# Patient Record
Sex: MALE | Race: WHITE | URBAN - METROPOLITAN AREA
[De-identification: names, ages, dates, MRNs, and addresses within clinical notes are randomized per-mention and may not be internally consistent; named-entity substitution may affect disease eponyms.]

---

## 2018-02-27 ENCOUNTER — OFFICE VISIT (OUTPATIENT)
Dept: DERMATOLOGY | Facility: CLINIC | Age: 63
End: 2018-02-27
Payer: COMMERCIAL

## 2018-02-27 VITALS
HEIGHT: 73 IN | SYSTOLIC BLOOD PRESSURE: 127 MMHG | BODY MASS INDEX: 24.98 KG/M2 | DIASTOLIC BLOOD PRESSURE: 71 MMHG | WEIGHT: 188.5 LBS | HEART RATE: 80 BPM

## 2018-02-27 DIAGNOSIS — D22.9 NEVUS: ICD-10-CM

## 2018-02-27 DIAGNOSIS — D48.5 NEOPLASM OF UNCERTAIN BEHAVIOR OF SKIN: ICD-10-CM

## 2018-02-27 DIAGNOSIS — Z85.828 HISTORY OF BASAL CELL CANCER: ICD-10-CM

## 2018-02-27 DIAGNOSIS — Z85.820 HISTORY OF MELANOMA: Primary | ICD-10-CM

## 2018-02-27 DIAGNOSIS — L82.1 SK (SEBORRHEIC KERATOSIS): ICD-10-CM

## 2018-02-27 DIAGNOSIS — L30.9 CHRONIC DERMATITIS OF HANDS: ICD-10-CM

## 2018-02-27 DIAGNOSIS — L81.4 LENTIGO: ICD-10-CM

## 2018-02-27 PROCEDURE — 88305 TISSUE EXAM BY PATHOLOGIST: CPT | Performed by: DERMATOLOGY

## 2018-02-27 PROCEDURE — 99203 OFFICE O/P NEW LOW 30 MIN: CPT | Mod: 25 | Performed by: DERMATOLOGY

## 2018-02-27 PROCEDURE — 11100 HC BIOPSY SKIN/SUBQ/MUC MEM, SINGLE LESION: CPT | Performed by: DERMATOLOGY

## 2018-02-27 RX ORDER — OXYBUTYNIN CHLORIDE 10 MG/1
10 TABLET, EXTENDED RELEASE ORAL DAILY
COMMUNITY

## 2018-02-27 RX ORDER — BETAMETHASONE DIPROPIONATE 0.5 MG/G
CREAM TOPICAL
Qty: 100 G | Refills: 3 | Status: SHIPPED | OUTPATIENT
Start: 2018-02-27

## 2018-02-27 RX ORDER — LATANOPROST 50 UG/ML
1 SOLUTION/ DROPS OPHTHALMIC AT BEDTIME
COMMUNITY

## 2018-02-27 RX ORDER — TIMOLOL MALEATE 2.5 MG/ML
1 SOLUTION OPHTHALMIC EVERY MORNING
COMMUNITY

## 2018-02-27 NOTE — NURSING NOTE
"Chief Complaint   Patient presents with     Derm Problem       Initial /71  Pulse 80  Ht 1.854 m (6' 1\")  Wt 85.5 kg (188 lb 8 oz)  BMI 24.87 kg/m2 Estimated body mass index is 24.87 kg/(m^2) as calculated from the following:    Height as of this encounter: 1.854 m (6' 1\").    Weight as of this encounter: 85.5 kg (188 lb 8 oz).  Medication Reconciliation: complete     "

## 2018-02-27 NOTE — MR AVS SNAPSHOT
After Visit Summary   2/27/2018    Jl Choi    MRN: [id number]           Patient Information     Date Of Birth          1955        Visit Information        Provider Department      2/27/2018 2:15 PM Christ Yang MD [facility]        Today's Diagnoses     History of melanoma    -  1    Lentigo        History of basal cell cancer        SK (seborrheic keratosis)        Nevus        Neoplasm of uncertain behavior of skin        Chronic dermatitis of hands          Care Instructions    Return to clinic in 4 weeks to recheck your hands and arms.  Cream RX at pharmacy in [place].   WET DRESSINGS for your arms and wrist.     WHEN TO USE:      Wet dressings play an important role in the treatment of eczema and dermatitis    Wet dressings should be used when you are is hot and itchy and if you wake at night due to the itch     Wet dressings may also be used if there is blood on the sheets or if the rash is still present despite treatment with cortisone ointments, moisturisers and bath oils    Early use of wet dressings will reduce the amount of cortisone creams needed to control the eczema    HOW THEY HELP:      Wet dressings help to reduce itch by cooling the skin. The itch is worse when the skin is hot and inflamed    Wet dressings help with the treatment of infection, as they help to clean the skin s surface    Applying moisturiser under the wet dressings helps to rehydrate the skin    Wet dressings protect the skin from fingernails and scratching, and help the skin to heal    HOW TO APPLY:    Getting Started You will need:    Bowl    Tepid water    Cortisone or anti-inflammatory cream (if prescribed)    Moisturizer     Disposable towels    Crepe bandages  Setting up:    Wash your hands    Fold disposable towels in half    Fill bowl with tepid water    Add disposable towels to bowl    Spoon creams out onto a dry towel    Applying creams:    Apply cortisone or  anti- inflammatory creams, as prescribed, to all areas affected with eczema     Apply moisturiser over the cortisone ointments and to the whole of the body and face    Apply wet towels:     Wrap the wet towels around the areas of eczema, using a few layers    Apply Crepe bandages: (or you may use Gauze or Kerlix)     Wrap crepe bandages around the wet towels, firmly but not tightly    Avoid direct contact of the bandage with the skin    Applying the wet T-shirt and bandana     Wet cool compresses can be applied to the neck as a scarf (only knot once), and a wet bandana can be applied to the head    The scarf and bandana should be applied only under supervision and not at bedtime    For the trunk, apply a wet T-shirt or singlet. This can be repeated as often as needed and a dry T-shirt can be applied over the top    Important information:     Wet dressings are best applied at night, however they can be used during the day if the eczema is severe    Wet dressings will dry after a few hours. Do not leave the dressings on dry (unless asleep) as dry dressings can irritate the skin by causing it to become hot, dry and itchy     Crepe bandages used for wet dressings may be washed in the washing machine. Do not wash or reuse the disposable towels     Wound Care Instructions     FOR SUPERFICIAL WOUNDS ON THE    L Shin        AFTER 24 HOURS YOU SHOULD REMOVE THE BANDAGE AND BEGIN DAILY DRESSING CHANGES AS FOLLOWS:     1) Remove Dressing.     2) Clean and dry the area with tap water using a Q-tip or sterile gauze pad.     3) Apply Aquafor, Vaseline, Polysporin ointment or Bacitracin ointment over entire wound.  Do NOT use Neosporin ointment.     4) Cover the wound with a band-aid, or a sterile non-stick gauze pad and micropore paper tape      REPEAT THESE INSTRUCTIONS AT LEAST ONCE A DAY UNTIL THE WOUND HAS COMPLETELY HEALED.    It is an old wives tale that a wound heals better when it is exposed to air and allowed to dry out.  The wound will heal faster with a better cosmetic result if it is kept moist with ointment and covered with a bandage.  Do not let the wound dry out.      IMPORTANT INFORMATION ON REVERSE SIDE.      Supplies Needed:      *Cotton tipped applicators (Q-tips)    *Polysporin Ointment or Bacitracin Ointment (NOT NEOSPORIN)    *Band-aids or non-stick gauze pads and micropore paper tape.              PATIENT INFORMATION    During the healing process you will notice a number of changes. All wounds develop a small halo of redness surrounding the wound.  This means healing is occurring. Severe itching with extensive redness usually indicates sensitivity to the ointment or bandage tape used to dress the wound.  You should call our office if this develops.      Swelling  and/or discoloration around your surgical site is common, particularly when performed around the eye.    All wounds normally drain.  The larger the wound the more drainage there will be.  After 7-10 days, you will notice the wound beginning to shrink and new skin will begin to grow.  The wound is healed when you can see skin has formed over the entire area.  A healed wound has a healthy, shiny look to the surface and is red to dark pink in color to normalize.  Wounds may take approximately 4-6 weeks to heal.  Larger wounds may take 6-8 weeks.  After the wound is healed you may discontinue dressing changes.    You may experience a sensation of tightness as your wound heals. This is normal and will gradually subside.    Your healed wound may be sensitive to temperature changes. This sensitivity improves with time, but if you re having a lot of discomfort, try to avoid temperature extremes.    Patients frequently experience itching after their wound appears to have healed because of the continue healing under the skin.  Plain Vaseline will help relieve the itching.                    Follow-ups after your visit        Who to contact     If you have questions or  "need follow up information about today's clinic visit or your schedule please contact [facility] directly at [phone].  Normal or non-critical lab and imaging results will be communicated to you by [employer], letter or phone within 4 business days after the clinic has received the results. If you do not hear from us within 7 days, please contact the clinic through [employer] or phone. If you have a critical or abnormal lab result, we will notify you by phone as soon as possible.  Submit refill requests through [employer] or call your pharmacy and they will forward the refill request to us. Please allow 3 business days for your refill to be completed.          Additional Information About Your Visit        [employer] Information     [employer] lets you send messages to your doctor, view your test results, renew your prescriptions, schedule appointments and more. To sign up, go to [email or web address] . Click on \"Log in\" on the left side of the screen, which will take you to the Welcome page. Then click on \"Sign up Now\" on the right side of the page.     You will be asked to enter the access code listed below, as well as some personal information. Please follow the directions to create your username and password.     Your access code is: [email or web address]  Expires: 2018  2:44 PM     Your access code will  in 90 days. If you need help or a new code, please call your [facility] clinic or [phone].        Care EveryWhere ID     This is your Care EveryWhere ID. This could be used by other organizations to access your [facility] medical records  [id number]        Your Vitals Were     Pulse Height BMI (Body Mass Index)             80 1.854 m (6' 1\") 24.87 kg/m2          Blood Pressure from Last 3 Encounters:   18 127/71    Weight from Last 3 Encounters:   18 85.5 kg (188 lb 8 oz)              We Performed the Following     BIOPSY SKIN/SUBQ/MUC MEM, SINGLE LESION     Surgical pathology exam        "   Today's Medication Changes          These changes are accurate as of 2/27/18  2:50 PM.  If you have any questions, ask your nurse or doctor.               Start taking these medicines.        Dose/Directions    augmented betamethasone dipropionate 0.05 % cream   Commonly known as:  DIPROLENE-AF   Used for:  History of melanoma, Lentigo, History of basal cell cancer, SK (seborrheic keratosis), Nevus, Neoplasm of uncertain behavior of skin, Chronic dermatitis of hands   Started by:  Christ Yang MD        Apply sparingly to affected area twice daily as needed.  Do not apply to face.   Quantity:  100 g   Refills:  3            Where to get your medicines      These medications were sent to [facility] [address] [place] [address] [address]   [address] [facility] [address]     Phone:  [phone]     augmented betamethasone dipropionate 0.05 % cream                Primary Care Provider Fax #    Physician No Ref-Primary [phone]       No address on file        Equal Access to Services     CADEN STEPHENSON AH: Hadii aad ku hadasho Soomaali, waaxda luqadaha, qaybta kaalmada adeegyada, tasha rios . So [facility] [phone].    ATENCIÓN: Si blaze bello, tiene a cazares disposición servicios gratuitos de asistencia lingüística. Llame al [phone].    We comply with applicable federal civil rights laws and [place] laws. We do not discriminate on the basis of race, color, national origin, age, disability, sex, sexual orientation, or gender identity.            Thank you!     Thank you for choosing [facility]  for your care. Our goal is always to provide you with excellent care. Hearing back from our patients is one way we can continue to improve our services. Please take a few minutes to complete the written survey that you may receive in the mail after your visit with us. Thank you!             Your Updated Medication List - Protect others around you:  Learn how to safely use, store and throw away your medicines at www.disposemymeds.org.          This list is accurate as of 2/27/18  2:50 PM.  Always use your most recent med list.                   Brand Name Dispense Instructions for use Diagnosis    augmented betamethasone dipropionate 0.05 % cream    DIPROLENE-AF    100 g    Apply sparingly to affected area twice daily as needed.  Do not apply to face.    History of melanoma, Lentigo, History of basal cell cancer, SK (seborrheic keratosis), Nevus, Neoplasm of uncertain behavior of skin, Chronic dermatitis of hands       latanoprost 0.005 % ophthalmic solution    XALATAN     1 drop At Bedtime    History of melanoma, Lentigo, History of basal cell cancer, SK (seborrheic keratosis), Nevus, Neoplasm of uncertain behavior of skin, Chronic dermatitis of hands       oxybutynin 10 MG 24 hr tablet    DITROPAN-XL     Take 10 mg by mouth daily    History of melanoma, Lentigo, History of basal cell cancer, SK (seborrheic keratosis), Nevus, Neoplasm of uncertain behavior of skin, Chronic dermatitis of hands       timolol 0.25 % ophthalmic gel-form    TIMOPTIC-XE     1 drop every morning    History of melanoma, Lentigo, History of basal cell cancer, SK (seborrheic keratosis), Nevus, Neoplasm of uncertain behavior of skin, Chronic dermatitis of hands

## 2018-02-27 NOTE — LETTER
2/27/2018         RE: Jl Choi  [address] [facility] [address]        Dear Colleague,    Thank you for referring your patient, Jl Choi, to the [facility]. Please see a copy of my visit note below.    Jl Choi is a 62 year old year old male patient here today for hx of melanoma no nodes check was on back years ago, hx of basal cell carcinoma.  He ntoes rash on hands.   .  Patient states this has been present for a while.  Patient reports the following symptoms:  fissuring.  Patient reports the following previous treatments none.  Patient reports the following modifying factors none.  Associated symptoms: none.  Patient has no other skin complaints today.  Remainder of the HPI, Meds, PMH, Allergies, FH, and SH was reviewed in chart.      Past Medical History:   Diagnosis Date     Basal cell carcinoma      Malignant melanoma (H)     Back       History reviewed. No pertinent surgical history.     History reviewed. No pertinent family history.    Social History     Social History     Marital status: Single     Spouse name: N/A     Number of children: N/A     Years of education: N/A     Occupational History     Not on file.     Social History Main Topics     Smoking status: Former Smoker     Smokeless tobacco: Former User     Alcohol use Not on file     Drug use: Not on file     Sexual activity: Not on file     Other Topics Concern     Not on file     Social History Narrative     No narrative on file       Outpatient Encounter Prescriptions as of 2/27/2018   Medication Sig Dispense Refill     augmented betamethasone dipropionate (DIPROLENE-AF) 0.05 % cream Apply sparingly to affected area twice daily as needed.  Do not apply to face. 100 g 3     oxybutynin (DITROPAN-XL) 10 MG 24 hr tablet Take 10 mg by mouth daily       latanoprost (XALATAN) 0.005 % ophthalmic solution 1 drop At Bedtime       timolol (TIMOPTIC-XE) 0.25 % ophthalmic gel-form 1 drop every morning       No  "facility-administered encounter medications on file as of 2/27/2018.              Review Of Systems  Skin: As above  Eyes: negative  Ears/Nose/Throat: negative  Respiratory: No shortness of breath, dyspnea on exertion, cough, or hemoptysis  Cardiovascular: negative  Gastrointestinal: negative  Genitourinary: negative  Musculoskeletal: negative  Neurologic: negative  Psychiatric: negative  Hematologic/Lymphatic/Immunologic: negative  Endocrine: negative      O:   NAD, WDWN, Alert & Oriented, Mood & Affect wnl, Vitals stable   Here today alone   /71  Pulse 80  Ht 1.854 m (6' 1\")  Wt 85.5 kg (188 lb 8 oz)  BMI 24.87 kg/m2   General appearance normal   Vitals stable   Alert, oriented and in no acute distress      Following lymph nodes palpated: Occipital, Cervical, Supraclavicular , axilla no lad   slk    pigmented maculeson left back and trunk with regular borders and pigment netwokrs   Fissured red plaques on hands    L shin 2cm red scaly papule         The remainder of the full exam was unremarkable; the following areas were examined:  conjunctiva/lids, oral mucosa, neck, peripheral vascular system, abdomen, lymph nodes, digits/nails, eccrine and apocrine glands, scalp/hair, face, neck, chest, abdomen, buttocks, back, RUE, LUE, RLE, LLE       Eyes: Conjunctivae/lids:Normal     ENT: Lips, buccal mucosa, tongue: normal    MSK:Normal    Cardiovascular: peripheral edema none    Pulm: Breathing Normal    Lymph Nodes: No Head and Neck Lymphadenopathy     Neuro/Psych: Orientation:Normal; Mood/Affect:Normal      A/P:  1. Hand dermatitis  Skin care discussed with patient   Diprolene with wet wraps  Return to clinic 4 weeks  2. Hx of melanoma, hx of basal cell carcinoma, seborrheic keratosis, letnigo, nevi  MELANOMA DISCUSSED WITH PATIENT:  I discussed the specifics of tumor, prognosis, metachronous melanoma, self exam, and genetics with the patient. I explained the need for monthly skin exams including and taught " the patient how to do this. Patient was asked about new or changing moles and a full skin exam was performed.   3. L shin r/o basal cell carcinoma   TANGENTIAL BIOPSY SENT OUT:  After consent, anesthesia with LEC and prep, tangential excision performed and specimen sent out for permanent section histology.  No complications and routine wound care. Patient told to call our office in 1-2 weeks for result.       BENIGN LESIONS DISCUSSED WITH PATIENT:  I discussed the specifics of tumor, prognosis, and genetics of benign lesions.  I explained that treatment of these lesions would be purely cosmetic and not medically neccessary.  I discussed with patient different removal options including excision, cautery and /or laser.      Nature and genetics of benign skin lesions dicussed with patient.  Signs and Symptoms of skin cancer discussed with patient.  ABCDEs of melanoma reviewed with patient.  Patient encouraged to perform monthly skin exams.  UV precautions reviewed with patient.  Skin care regimen reviewed with patient: Eliminate harsh soaps, i.e. Dial, zest, irsih spring; Mild soaps such as Cetaphil or Dove sensitive skin, avoid hot or cold showers, aggressive use of emollients including vanicream, cetaphil or cerave discussed with patient.    Risks of non-melanoma skin cancer discussed with patient   Return to clinic 4 weeks       Again, thank you for allowing me to participate in the care of your patient.        Sincerely,        Christ Yang MD

## 2018-02-27 NOTE — PATIENT INSTRUCTIONS
Return to clinic in 4 weeks to recheck your hands and arms.  Cream RX at pharmacy in [place].   WET DRESSINGS for your arms and wrist.     WHEN TO USE:      Wet dressings play an important role in the treatment of eczema and dermatitis    Wet dressings should be used when you are is hot and itchy and if you wake at night due to the itch     Wet dressings may also be used if there is blood on the sheets or if the rash is still present despite treatment with cortisone ointments, moisturisers and bath oils    Early use of wet dressings will reduce the amount of cortisone creams needed to control the eczema    HOW THEY HELP:      Wet dressings help to reduce itch by cooling the skin. The itch is worse when the skin is hot and inflamed    Wet dressings help with the treatment of infection, as they help to clean the skin s surface    Applying moisturiser under the wet dressings helps to rehydrate the skin    Wet dressings protect the skin from fingernails and scratching, and help the skin to heal    HOW TO APPLY:    Getting Started You will need:    Bowl    Tepid water    Cortisone or anti-inflammatory cream (if prescribed)    Moisturizer     Disposable towels    Crepe bandages  Setting up:    Wash your hands    Fold disposable towels in half    Fill bowl with tepid water    Add disposable towels to bowl    Spoon creams out onto a dry towel    Applying creams:    Apply cortisone or anti- inflammatory creams, as prescribed, to all areas affected with eczema     Apply moisturiser over the cortisone ointments and to the whole of the body and face    Apply wet towels:     Wrap the wet towels around the areas of eczema, using a few layers    Apply Crepe bandages: (or you may use Gauze or Kerlix)     Wrap crepe bandages around the wet towels, firmly but not tightly    Avoid direct contact of the bandage with the skin    Applying the wet T-shirt and bandana     Wet cool compresses can be applied to the neck as a scarf  (only knot once), and a wet bandana can be applied to the head    The scarf and bandana should be applied only under supervision and not at bedtime    For the trunk, apply a wet T-shirt or singlet. This can be repeated as often as needed and a dry T-shirt can be applied over the top    Important information:     Wet dressings are best applied at night, however they can be used during the day if the eczema is severe    Wet dressings will dry after a few hours. Do not leave the dressings on dry (unless asleep) as dry dressings can irritate the skin by causing it to become hot, dry and itchy     Crepe bandages used for wet dressings may be washed in the washing machine. Do not wash or reuse the disposable towels     Wound Care Instructions     FOR SUPERFICIAL WOUNDS ON THE    L Shin        AFTER 24 HOURS YOU SHOULD REMOVE THE BANDAGE AND BEGIN DAILY DRESSING CHANGES AS FOLLOWS:     1) Remove Dressing.     2) Clean and dry the area with tap water using a Q-tip or sterile gauze pad.     3) Apply Aquafor, Vaseline, Polysporin ointment or Bacitracin ointment over entire wound.  Do NOT use Neosporin ointment.     4) Cover the wound with a band-aid, or a sterile non-stick gauze pad and micropore paper tape      REPEAT THESE INSTRUCTIONS AT LEAST ONCE A DAY UNTIL THE WOUND HAS COMPLETELY HEALED.    It is an old wives tale that a wound heals better when it is exposed to air and allowed to dry out. The wound will heal faster with a better cosmetic result if it is kept moist with ointment and covered with a bandage.  Do not let the wound dry out.      IMPORTANT INFORMATION ON REVERSE SIDE.      Supplies Needed:      *Cotton tipped applicators (Q-tips)    *Polysporin Ointment or Bacitracin Ointment (NOT NEOSPORIN)    *Band-aids or non-stick gauze pads and micropore paper tape.              PATIENT INFORMATION    During the healing process you will notice a number of changes. All wounds develop a small halo of redness surrounding  the wound.  This means healing is occurring. Severe itching with extensive redness usually indicates sensitivity to the ointment or bandage tape used to dress the wound.  You should call our office if this develops.      Swelling  and/or discoloration around your surgical site is common, particularly when performed around the eye.    All wounds normally drain.  The larger the wound the more drainage there will be.  After 7-10 days, you will notice the wound beginning to shrink and new skin will begin to grow.  The wound is healed when you can see skin has formed over the entire area.  A healed wound has a healthy, shiny look to the surface and is red to dark pink in color to normalize.  Wounds may take approximately 4-6 weeks to heal.  Larger wounds may take 6-8 weeks.  After the wound is healed you may discontinue dressing changes.    You may experience a sensation of tightness as your wound heals. This is normal and will gradually subside.    Your healed wound may be sensitive to temperature changes. This sensitivity improves with time, but if you re having a lot of discomfort, try to avoid temperature extremes.    Patients frequently experience itching after their wound appears to have healed because of the continue healing under the skin.  Plain Vaseline will help relieve the itching.

## 2018-02-27 NOTE — PROGRESS NOTES
Jl Choi is a 62 year old year old male patient here today for hx of melanoma no nodes check was on back years ago, hx of basal cell carcinoma.  He ntoes rash on hands.   .  Patient states this has been present for a while.  Patient reports the following symptoms:  fissuring.  Patient reports the following previous treatments none.  Patient reports the following modifying factors none.  Associated symptoms: none.  Patient has no other skin complaints today.  Remainder of the HPI, Meds, PMH, Allergies, FH, and SH was reviewed in chart.      Past Medical History:   Diagnosis Date     Basal cell carcinoma      Malignant melanoma (H)     Back       History reviewed. No pertinent surgical history.     History reviewed. No pertinent family history.    Social History     Social History     Marital status: Single     Spouse name: N/A     Number of children: N/A     Years of education: N/A     Occupational History     Not on file.     Social History Main Topics     Smoking status: Former Smoker     Smokeless tobacco: Former User     Alcohol use Not on file     Drug use: Not on file     Sexual activity: Not on file     Other Topics Concern     Not on file     Social History Narrative     No narrative on file       Outpatient Encounter Prescriptions as of 2/27/2018   Medication Sig Dispense Refill     augmented betamethasone dipropionate (DIPROLENE-AF) 0.05 % cream Apply sparingly to affected area twice daily as needed.  Do not apply to face. 100 g 3     oxybutynin (DITROPAN-XL) 10 MG 24 hr tablet Take 10 mg by mouth daily       latanoprost (XALATAN) 0.005 % ophthalmic solution 1 drop At Bedtime       timolol (TIMOPTIC-XE) 0.25 % ophthalmic gel-form 1 drop every morning       No facility-administered encounter medications on file as of 2/27/2018.              Review Of Systems  Skin: As above  Eyes: negative  Ears/Nose/Throat: negative  Respiratory: No shortness of breath, dyspnea on exertion, cough, or  "hemoptysis  Cardiovascular: negative  Gastrointestinal: negative  Genitourinary: negative  Musculoskeletal: negative  Neurologic: negative  Psychiatric: negative  Hematologic/Lymphatic/Immunologic: negative  Endocrine: negative      O:   NAD, WDWN, Alert & Oriented, Mood & Affect wnl, Vitals stable   Here today alone   /71  Pulse 80  Ht 1.854 m (6' 1\")  Wt 85.5 kg (188 lb 8 oz)  BMI 24.87 kg/m2   General appearance normal   Vitals stable   Alert, oriented and in no acute distress      Following lymph nodes palpated: Occipital, Cervical, Supraclavicular , axilla no lad   slk    pigmented maculeson left back and trunk with regular borders and pigment netwokrs   Fissured red plaques on hands    L shin 2cm red scaly papule         The remainder of the full exam was unremarkable; the following areas were examined:  conjunctiva/lids, oral mucosa, neck, peripheral vascular system, abdomen, lymph nodes, digits/nails, eccrine and apocrine glands, scalp/hair, face, neck, chest, abdomen, buttocks, back, RUE, LUE, RLE, LLE       Eyes: Conjunctivae/lids:Normal     ENT: Lips, buccal mucosa, tongue: normal    MSK:Normal    Cardiovascular: peripheral edema none    Pulm: Breathing Normal    Lymph Nodes: No Head and Neck Lymphadenopathy     Neuro/Psych: Orientation:Normal; Mood/Affect:Normal      A/P:  1. Hand dermatitis  Skin care discussed with patient   Diprolene with wet wraps  Return to clinic 4 weeks  2. Hx of melanoma, hx of basal cell carcinoma, seborrheic keratosis, letnigo, nevi  MELANOMA DISCUSSED WITH PATIENT:  I discussed the specifics of tumor, prognosis, metachronous melanoma, self exam, and genetics with the patient. I explained the need for monthly skin exams including and taught the patient how to do this. Patient was asked about new or changing moles and a full skin exam was performed.   3. L shin r/o basal cell carcinoma   TANGENTIAL BIOPSY SENT OUT:  After consent, anesthesia with LEC and prep, " tangential excision performed and specimen sent out for permanent section histology.  No complications and routine wound care. Patient told to call our office in 1-2 weeks for result.       BENIGN LESIONS DISCUSSED WITH PATIENT:  I discussed the specifics of tumor, prognosis, and genetics of benign lesions.  I explained that treatment of these lesions would be purely cosmetic and not medically neccessary.  I discussed with patient different removal options including excision, cautery and /or laser.      Nature and genetics of benign skin lesions dicussed with patient.  Signs and Symptoms of skin cancer discussed with patient.  ABCDEs of melanoma reviewed with patient.  Patient encouraged to perform monthly skin exams.  UV precautions reviewed with patient.  Skin care regimen reviewed with patient: Eliminate harsh soaps, i.e. Dial, zest, irsih spring; Mild soaps such as Cetaphil or Dove sensitive skin, avoid hot or cold showers, aggressive use of emollients including vanicream, cetaphil or cerave discussed with patient.    Risks of non-melanoma skin cancer discussed with patient   Return to clinic 4 weeks

## 2018-03-02 LAB — COPATH REPORT: NORMAL

## 2018-03-21 ENCOUNTER — OFFICE VISIT (OUTPATIENT)
Dept: DERMATOLOGY | Facility: CLINIC | Age: 63
End: 2018-03-21
Payer: COMMERCIAL

## 2018-03-21 VITALS — DIASTOLIC BLOOD PRESSURE: 88 MMHG | OXYGEN SATURATION: 95 % | SYSTOLIC BLOOD PRESSURE: 139 MMHG | HEART RATE: 69 BPM

## 2018-03-21 DIAGNOSIS — C44.719 BASAL CELL CARCINOMA OF LEFT LOWER EXTREMITY: Primary | ICD-10-CM

## 2018-03-21 PROCEDURE — 17313 MOHS 1 STAGE T/A/L: CPT | Performed by: DERMATOLOGY

## 2018-03-21 NOTE — PATIENT INSTRUCTIONS
Open Wound Care     for Left Chin        ? No strenuous activity for 48 hours    ? Take Tylenol as needed for discomfort.                                                .         ? Do not drink alcoholic beverages for 48 hours.    ? Keep the pressure bandage in place for 24 hours. If the bandage becomes blood tinged or loose, reinforce it with gauze and tape.        (Refer to the reverse side of this page for management of bleeding).    ? Remove bandage in 24 hours and begin wound care as follows:     1. Clean area with tap water using a Q tip or gauze pad, (shower / bathe normally)  2. Dry wound with Q tip or gauze pad  3. Apply Aquaphor, Vaseline, Polysporin or Bacitracin Ointment with a Q tip    Do NOT use Neosporin Ointment *  4. Cover the wound with a band-aid or nonstick gauze pad and paper tape.  5. Repeat wound care once a day until wound is completely healed.    It is an old wives tale that a wound heals better when it is exposed to air and allowed to dry out. The wound will heal faster with a better cosmetic result if it is kept moist with ointment and covered with a bandage.  Do not let the wound dry out.      Supplies Needed:                Qtips or gauze pads                Polysporin or Bacitracin Ointment                Bandaids or nonstick gauze pads and paper tape    Wound care kits and brown paper tape are available for purchase at   the pharmacy.    BLEEDIN. Use tightly rolled up gauze or cloth to apply direct pressure over the bandage for 20   minutes.  2. Reapply pressure for an additional 20 minutes if necessary  3. Call the office or go to the nearest emergency room if pressure fails to stop the bleeding.  4. Use additional gauze and tape to maintain pressure once the bleeding has stopped.  5. Begin wound care 24 hours after surgery as directed.                  WOUND HEALING    1. One week after surgery a pink / red halo will form around the outside of the wound.   This is new  skin.  2. The center of the wound will appear yellowish white and produce some drainage.  3. The pink halo will slowly migrate in toward the center of the wound until the wound is covered with new shiny pink skin.  4. There will be no more drainage when the wound is completely healed.  5. It will take six months to one year for the redness to fade.  6. The scar may be itchy, tight and sensitive to extreme temperatures for a year after the surgery.  7. Massaging the area several times a day for several minutes after the wound is completely healed will help the scar soften and normalize faster. Begin massage only after healing is complete.      In case of emergency call: Dr Yang: [phone]     [facility]: [phone]    [facility]: [phone]

## 2018-03-21 NOTE — MR AVS SNAPSHOT
After Visit Summary   3/21/2018    Jl Choi    MRN: [id number]           Patient Information     Date Of Birth          1955        Visit Information        Provider Department      3/21/2018 8:00 AM Christ Yang MD [facility]        Care Instructions    Open Wound Care     for Left Chin        ? No strenuous activity for 48 hours    ? Take Tylenol as needed for discomfort.                                                .         ? Do not drink alcoholic beverages for 48 hours.    ? Keep the pressure bandage in place for 24 hours. If the bandage becomes blood tinged or loose, reinforce it with gauze and tape.        (Refer to the reverse side of this page for management of bleeding).    ? Remove bandage in 24 hours and begin wound care as follows:     1. Clean area with tap water using a Q tip or gauze pad, (shower / bathe normally)  2. Dry wound with Q tip or gauze pad  3. Apply Aquaphor, Vaseline, Polysporin or Bacitracin Ointment with a Q tip    Do NOT use Neosporin Ointment *  4. Cover the wound with a band-aid or nonstick gauze pad and paper tape.  5. Repeat wound care once a day until wound is completely healed.    It is an old wives tale that a wound heals better when it is exposed to air and allowed to dry out. The wound will heal faster with a better cosmetic result if it is kept moist with ointment and covered with a bandage.  Do not let the wound dry out.      Supplies Needed:                Qtips or gauze pads                Polysporin or Bacitracin Ointment                Bandaids or nonstick gauze pads and paper tape    Wound care kits and brown paper tape are available for purchase at   the pharmacy.    BLEEDIN. Use tightly rolled up gauze or cloth to apply direct pressure over the bandage for 20   minutes.  2. Reapply pressure for an additional 20 minutes if necessary  3. Call the office or go to the nearest emergency room if pressure fails to  stop the bleeding.  4. Use additional gauze and tape to maintain pressure once the bleeding has stopped.  5. Begin wound care 24 hours after surgery as directed.                  WOUND HEALING    1. One week after surgery a pink / red halo will form around the outside of the wound.   This is new skin.  2. The center of the wound will appear yellowish white and produce some drainage.  3. The pink halo will slowly migrate in toward the center of the wound until the wound is covered with new shiny pink skin.  4. There will be no more drainage when the wound is completely healed.  5. It will take six months to one year for the redness to fade.  6. The scar may be itchy, tight and sensitive to extreme temperatures for a year after the surgery.  7. Massaging the area several times a day for several minutes after the wound is completely healed will help the scar soften and normalize faster. Begin massage only after healing is complete.      In case of emergency call: Dr Yang: [phone]     [facility]: [phone]    [facility]: [phone]              Follow-ups after your visit        Your next 10 appointments already scheduled     2018  1:45 PM CDT   [facility]    [address] [address] [phone]              Who to contact     If you have questions or need follow up information about today's clinic visit or your schedule please contact [facility] directly at [phone].  Normal or non-critical lab and imaging results will be communicated to you by MyChart, letter or phone within 4 business days after the clinic has received the results. If you do not hear from us within 7 days, please contact the clinic through MyChart or phone. If you have a critical or abnormal lab result, we will notify you by phone as soon as possible.  Submit refill requests  "through [employer] or call your pharmacy and they will forward the refill request to us. Please allow 3 business days for your refill to be completed.          Additional Information About Your Visit        [employer] Information     [employer] lets you send messages to your doctor, view your test results, renew your prescriptions, schedule appointments and more. To sign up, go to [email or web address] . Click on \"Log in\" on the left side of the screen, which will take you to the Welcome page. Then click on \"Sign up Now\" on the right side of the page.     You will be asked to enter the access code listed below, as well as some personal information. Please follow the directions to create your username and password.     Your access code is: [email or web address]  Expires: 2018  3:44 PM     Your access code will  in 90 days. If you need help or a new code, please call your [facility] clinic or [phone].        Care EveryWhere ID     This is your Care EveryWhere ID. This could be used by other organizations to access your [facility] medical records  [id number]        Your Vitals Were     Pulse Pulse Oximetry                69 95%           Blood Pressure from Last 3 Encounters:   18 139/88   18 127/71    Weight from Last 3 Encounters:   18 85.5 kg (188 lb 8 oz)              Today, you had the following     No orders found for display       Primary Care Provider Fax #    Physician No Ref-Primary [phone]       No address on file        Equal Access to Services     [facility] Center[facility] : Hadii amanda waldropo Somaryali, waaxda luqadaha, qaybta kaalmada alyyamaggie, tasha rios . So [facility] [phone].    ATENCIÓN: Si habla español, tiene a cazares disposición servicios gratuitos de asistencia lingüística. Llame al [phone].    We comply with applicable federal civil rights laws and [place] laws. We do not discriminate on the basis of race, color, national origin, age, disability, sex, " sexual orientation, or gender identity.            Thank you!     Thank you for choosing [facility]  for your care. Our goal is always to provide you with excellent care. Hearing back from our patients is one way we can continue to improve our services. Please take a few minutes to complete the written survey that you may receive in the mail after your visit with us. Thank you!             Your Updated Medication List - Protect others around you: Learn how to safely use, store and throw away your medicines at www.disposemymeds.org.          This list is accurate as of 3/21/18  9:46 AM.  Always use your most recent med list.                   Brand Name Dispense Instructions for use Diagnosis    augmented betamethasone dipropionate 0.05 % cream    DIPROLENE-AF    100 g    Apply sparingly to affected area twice daily as needed.  Do not apply to face.    History of melanoma, Lentigo, History of basal cell cancer, SK (seborrheic keratosis), Nevus, Neoplasm of uncertain behavior of skin, Chronic dermatitis of hands       latanoprost 0.005 % ophthalmic solution    XALATAN     1 drop At Bedtime    History of melanoma, Lentigo, History of basal cell cancer, SK (seborrheic keratosis), Nevus, Neoplasm of uncertain behavior of skin, Chronic dermatitis of hands       oxybutynin 10 MG 24 hr tablet    DITROPAN-XL     Take 10 mg by mouth daily    History of melanoma, Lentigo, History of basal cell cancer, SK (seborrheic keratosis), Nevus, Neoplasm of uncertain behavior of skin, Chronic dermatitis of hands       timolol 0.25 % ophthalmic gel-form    TIMOPTIC-XE     1 drop every morning    History of melanoma, Lentigo, History of basal cell cancer, SK (seborrheic keratosis), Nevus, Neoplasm of uncertain behavior of skin, Chronic dermatitis of hands

## 2018-03-21 NOTE — LETTER
3/21/2018         RE: Jl Choi  [address] [place] [facility] [address]        Dear Colleague,    Thank you for referring your patient, Jl Choi, to the [facility]. Please see a copy of my visit note below.    Jl Choi is a 62 year old year old male patient here today for e mofbcc on left shin.  Patient reports the following previous treatments none.  Patient reports the following modifying factors none.  Associated symptoms: none.  Patient has no other skin complaints today.  Remainder of the HPI, Meds, PMH, Allergies, FH, and SH was reviewed in chart.    Pertinent Hx:   Melanoma   Past Medical History:   Diagnosis Date     Basal cell carcinoma      Malignant melanoma (H)     Back       History reviewed. No pertinent surgical history.     History reviewed. No pertinent family history.    Social History     Social History     Marital status: Single     Spouse name: N/A     Number of children: N/A     Years of education: N/A     Occupational History     Not on file.     Social History Main Topics     Smoking status: Former Smoker     Smokeless tobacco: Former User     Alcohol use Not on file     Drug use: Not on file     Sexual activity: Not on file     Other Topics Concern     Not on file     Social History Narrative       Outpatient Encounter Prescriptions as of 3/21/2018   Medication Sig Dispense Refill     oxybutynin (DITROPAN-XL) 10 MG 24 hr tablet Take 10 mg by mouth daily       latanoprost (XALATAN) 0.005 % ophthalmic solution 1 drop At Bedtime       timolol (TIMOPTIC-XE) 0.25 % ophthalmic gel-form 1 drop every morning       augmented betamethasone dipropionate (DIPROLENE-AF) 0.05 % cream Apply sparingly to affected area twice daily as needed.  Do not apply to face. 100 g 3     No facility-administered encounter medications on file as of 3/21/2018.              Review Of Systems  Skin: As above  Eyes: negative  Ears/Nose/Throat: negative  Respiratory: No shortness of breath, dyspnea on  exertion, cough, or hemoptysis  Cardiovascular: negative  Gastrointestinal: negative  Genitourinary: negative  Musculoskeletal: negative  Neurologic: negative  Psychiatric: negative  Hematologic/Lymphatic/Immunologic: negative  Endocrine: negative      O:   NAD, WDWN, Alert & Oriented, Mood & Affect wnl, Vitals stable   Here today alone   /88  Pulse 69  SpO2 95%   General appearance normal   Vitals stable   Alert, oriented and in no acute distress     L shin 2cm red plaque       Eyes: Conjunctivae/lids:Normal     ENT: Lips, buccal mucosa, tongue: normal    MSK:Normal    Cardiovascular: peripheral edema none    Pulm: Breathing Normal    Neuro/Psych: Orientation:Normal; Mood/Affect:Normal      A/P:  1. L shin basal cell carcinoma   MOHS:   Location    After PGACAC discussed with patient, decision for Mohs surgery was made. Indication for Mohs was Location. Patient confirmed the site with Dr. Yang.  After anesthesia with LEC, the tumor was excised using standard Mohs technique in 1 stages(s).  CLEAR MARGINS OBTAINED and Final defect size was 2.6 cm.       REPAIR SECOND INTENT: We discussed the options for wound management in full with the patient including risks/benefits/possible outcomes. Decision made to allow the wound to heal by second intention. EBL minimal; complications none; wound care routine.  The patient was discharged in good condition and will return in one month or prn for wound evaluation.    BENIGN LESIONS DISCUSSED WITH PATIENT:  I discussed the specifics of tumor, prognosis, and genetics of benign lesions.  I explained that treatment of these lesions would be purely cosmetic and not medically neccessary.  I discussed with patient different removal options including excision, cautery and /or laser.      Nature and genetics of benign skin lesions dicussed with patient.  Signs and Symptoms of skin cancer discussed with patient.  Patient encouraged to perform monthly skin exams.  UV precautions  reviewed with patient.  Patient to follow up with Primary Care provider regarding elevated blood pressure.  Skin care regimen reviewed with patient: Eliminate harsh soaps, i.e. Dial, zest, irsih spring; Mild soaps such as Cetaphil or Dove sensitive skin, avoid hot or cold showers, aggressive use of emollients including vanicream, cetaphil or cerave discussed with patient.    Risks of non-melanoma skin cancer discussed with patient   Return to clinic 6 months      Again, thank you for allowing me to participate in the care of your patient.        Sincerely,        Christ Yang MD

## 2018-03-21 NOTE — PROGRESS NOTES
Jl Choi is a 62 year old year old male patient here today for e mofbcc on left shin.  Patient reports the following previous treatments none.  Patient reports the following modifying factors none.  Associated symptoms: none.  Patient has no other skin complaints today.  Remainder of the HPI, Meds, PMH, Allergies, FH, and SH was reviewed in chart.    Pertinent Hx:   Melanoma   Past Medical History:   Diagnosis Date     Basal cell carcinoma      Malignant melanoma (H)     Back       History reviewed. No pertinent surgical history.     History reviewed. No pertinent family history.    Social History     Social History     Marital status: Single     Spouse name: N/A     Number of children: N/A     Years of education: N/A     Occupational History     Not on file.     Social History Main Topics     Smoking status: Former Smoker     Smokeless tobacco: Former User     Alcohol use Not on file     Drug use: Not on file     Sexual activity: Not on file     Other Topics Concern     Not on file     Social History Narrative       Outpatient Encounter Prescriptions as of 3/21/2018   Medication Sig Dispense Refill     oxybutynin (DITROPAN-XL) 10 MG 24 hr tablet Take 10 mg by mouth daily       latanoprost (XALATAN) 0.005 % ophthalmic solution 1 drop At Bedtime       timolol (TIMOPTIC-XE) 0.25 % ophthalmic gel-form 1 drop every morning       augmented betamethasone dipropionate (DIPROLENE-AF) 0.05 % cream Apply sparingly to affected area twice daily as needed.  Do not apply to face. 100 g 3     No facility-administered encounter medications on file as of 3/21/2018.              Review Of Systems  Skin: As above  Eyes: negative  Ears/Nose/Throat: negative  Respiratory: No shortness of breath, dyspnea on exertion, cough, or hemoptysis  Cardiovascular: negative  Gastrointestinal: negative  Genitourinary: negative  Musculoskeletal: negative  Neurologic: negative  Psychiatric: negative  Hematologic/Lymphatic/Immunologic:  negative  Endocrine: negative      O:   NAD, WDWN, Alert & Oriented, Mood & Affect wnl, Vitals stable   Here today alone   /88  Pulse 69  SpO2 95%   General appearance normal   Vitals stable   Alert, oriented and in no acute distress     L shin 2cm red plaque       Eyes: Conjunctivae/lids:Normal     ENT: Lips, buccal mucosa, tongue: normal    MSK:Normal    Cardiovascular: peripheral edema none    Pulm: Breathing Normal    Neuro/Psych: Orientation:Normal; Mood/Affect:Normal      A/P:  1. L shin basal cell carcinoma   MOHS:   Location    After PGACAC discussed with patient, decision for Mohs surgery was made. Indication for Mohs was Location. Patient confirmed the site with Dr. Yang.  After anesthesia with LEC, the tumor was excised using standard Mohs technique in 1 stages(s).  CLEAR MARGINS OBTAINED and Final defect size was 2.6 cm.       REPAIR SECOND INTENT: We discussed the options for wound management in full with the patient including risks/benefits/possible outcomes. Decision made to allow the wound to heal by second intention. EBL minimal; complications none; wound care routine.  The patient was discharged in good condition and will return in one month or prn for wound evaluation.    BENIGN LESIONS DISCUSSED WITH PATIENT:  I discussed the specifics of tumor, prognosis, and genetics of benign lesions.  I explained that treatment of these lesions would be purely cosmetic and not medically neccessary.  I discussed with patient different removal options including excision, cautery and /or laser.      Nature and genetics of benign skin lesions dicussed with patient.  Signs and Symptoms of skin cancer discussed with patient.  Patient encouraged to perform monthly skin exams.  UV precautions reviewed with patient.  Patient to follow up with Primary Care provider regarding elevated blood pressure.  Skin care regimen reviewed with patient: Eliminate harsh soaps, i.e. Dial, zest, irsih spring; Mild soaps  such as Cetaphil or Dove sensitive skin, avoid hot or cold showers, aggressive use of emollients including vanicream, cetaphil or cerave discussed with patient.    Risks of non-melanoma skin cancer discussed with patient   Return to clinic 6 months

## 2018-03-21 NOTE — NURSING NOTE
Surgical Office Location :   [facility]  [address] [place] [address]

## 2018-03-21 NOTE — NURSING NOTE
Chief Complaint   Patient presents with     Derm Problem     mohs       Vitals:    03/21/18 0831   BP: (!) 156/93   Pulse: 69   SpO2: 95%     Wt Readings from Last 1 Encounters:   02/27/18 85.5 kg (188 lb 8 oz)       Mary Padron LPN.................3/21/2018

## 2018-04-02 ENCOUNTER — TELEPHONE (OUTPATIENT)
Dept: DERMATOLOGY | Facility: CLINIC | Age: 63
End: 2018-04-02

## 2018-04-02 NOTE — TELEPHONE ENCOUNTER
Reason for Call:  Other     Detailed comments: Pt has follow up Mohs appt scheduled for tomorrow, but cannot make that appt. The next available appt is 05/02 - Is it okay to wait that long? - Please advise    Phone Number Patient can be reached at: Home number on file [phone] (home)    Best Time: Any    Can we leave a detailed message on this number? YES    Call taken on 4/2/2018 at 10:42 AM by Denise Behrendt

## 2018-04-10 ENCOUNTER — OFFICE VISIT (OUTPATIENT)
Dept: DERMATOLOGY | Facility: CLINIC | Age: 63
End: 2018-04-10
Payer: COMMERCIAL

## 2018-04-10 VITALS — DIASTOLIC BLOOD PRESSURE: 70 MMHG | OXYGEN SATURATION: 96 % | SYSTOLIC BLOOD PRESSURE: 136 MMHG | HEART RATE: 70 BPM

## 2018-04-10 DIAGNOSIS — Z85.828 HISTORY OF SKIN CANCER: Primary | ICD-10-CM

## 2018-04-10 PROCEDURE — 99212 OFFICE O/P EST SF 10 MIN: CPT | Performed by: DERMATOLOGY

## 2018-04-10 NOTE — NURSING NOTE
Chief Complaint   Patient presents with     Derm Problem     fu mohs       Vitals:    04/10/18 1301   BP: 136/70   Pulse: 70   SpO2: 96%     Wt Readings from Last 1 Encounters:   02/27/18 85.5 kg (188 lb 8 oz)     Mary Padron LPN.................4/10/2018

## 2018-04-10 NOTE — MR AVS SNAPSHOT
"              After Visit Summary   4/10/2018    Jl Choi    MRN: [id number]           Patient Information     Date Of Birth          1955        Visit Information        Provider Department      4/10/2018 1:30 PM Christ Yang MD [facility]        Today's Diagnoses     History of skin cancer    -  1       Follow-ups after your visit        Who to contact     If you have questions or need follow up information about today's clinic visit or your schedule please contact [facility] directly at [phone].  Normal or non-critical lab and imaging results will be communicated to you by Hotlisthart, letter or phone within 4 business days after the clinic has received the results. If you do not hear from us within 7 days, please contact the clinic through Hotlisthart or phone. If you have a critical or abnormal lab result, we will notify you by phone as soon as possible.  Submit refill requests through [facility] or call your pharmacy and they will forward the refill request to us. Please allow 3 business days for your refill to be completed.          Additional Information About Your Visit        MyChart Information     [employer] lets you send messages to your doctor, view your test results, renew your prescriptions, schedule appointments and more. To sign up, go to [email or web address] . Click on \"Log in\" on the left side of the screen, which will take you to the Welcome page. Then click on \"Sign up Now\" on the right side of the page.     You will be asked to enter the access code listed below, as well as some personal information. Please follow the directions to create your username and password.     Your access code is: [email or web address]  Expires: 2018  3:44 PM     Your access code will  in 90 days. If you need help or a new code, please call your [facility] or [phone].        Care EveryWhere ID     This is your Care EveryWhere ID. This could be used by other " organizations to access your [facility] medical records  [id number]        Your Vitals Were     Pulse Pulse Oximetry                70 96%           Blood Pressure from Last 3 Encounters:   04/10/18 136/70   03/21/18 139/88   02/27/18 127/71    Weight from Last 3 Encounters:   02/27/18 85.5 kg (188 lb 8 oz)              Today, you had the following     No orders found for display       Primary Care Provider Fax #    Physician No Ref-Primary [phone]       No address on file        Equal Access to Services     CADEN STEPHENSON : Hadii aad ku hadasho Soomaali, waaxda luqadaha, qaybta kaalmada adeegyada, waxay safiain hayaan aly rios . So [facility] [phone].    ATENCIÓN: Si habla español, tiene a cazares disposición servicios gratuitos de asistencia lingüística. Llame al [phone].    We comply with applicable federal civil rights laws and [place] laws. We do not discriminate on the basis of race, color, national origin, age, disability, sex, sexual orientation, or gender identity.            Thank you!     Thank you for choosing [facility]  for your care. Our goal is always to provide you with excellent care. Hearing back from our patients is one way we can continue to improve our services. Please take a few minutes to complete the written survey that you may receive in the mail after your visit with us. Thank you!             Your Updated Medication List - Protect others around you: Learn how to safely use, store and throw away your medicines at www.disposemymeds.org.          This list is accurate as of 4/10/18  1:34 PM.  Always use your most recent med list.                   Brand Name Dispense Instructions for use Diagnosis    augmented betamethasone dipropionate 0.05 % cream    DIPROLENE-AF    100 g    Apply sparingly to affected area twice daily as needed.  Do not apply to face.    History of melanoma, Lentigo, History of basal cell cancer, SK (seborrheic keratosis), Nevus, Neoplasm of  uncertain behavior of skin, Chronic dermatitis of hands       latanoprost 0.005 % ophthalmic solution    XALATAN     1 drop At Bedtime    History of melanoma, Lentigo, History of basal cell cancer, SK (seborrheic keratosis), Nevus, Neoplasm of uncertain behavior of skin, Chronic dermatitis of hands       oxybutynin 10 MG 24 hr tablet    DITROPAN-XL     Take 10 mg by mouth daily    History of melanoma, Lentigo, History of basal cell cancer, SK (seborrheic keratosis), Nevus, Neoplasm of uncertain behavior of skin, Chronic dermatitis of hands       timolol 0.25 % ophthalmic gel-form    TIMOPTIC-XE     1 drop every morning    History of melanoma, Lentigo, History of basal cell cancer, SK (seborrheic keratosis), Nevus, Neoplasm of uncertain behavior of skin, Chronic dermatitis of hands

## 2018-04-10 NOTE — LETTER
4/10/2018         RE: Jl Choi  [address] [facility] [address]        Dear Colleague,    Thank you for referring your patient, Jl Choi, to the [facility]. Please see a copy of my visit note below.    Jl Choi is a 62 year old year old male patient here today for f/u wound check on left shin.  Healing well, no issues. Patient reports the following previous treatments none.  Patient reports the following modifying factors none.  Associated symptoms: none.  Patient has no other skin complaints today.  Remainder of the HPI, Meds, PMH, Allergies, FH, and SH was reviewed in chart.      Past Medical History:   Diagnosis Date     Basal cell carcinoma      Malignant melanoma (H)     Back       History reviewed. No pertinent surgical history.     History reviewed. No pertinent family history.    Social History     Social History     Marital status: Single     Spouse name: N/A     Number of children: N/A     Years of education: N/A     Occupational History     Not on file.     Social History Main Topics     Smoking status: Former Smoker     Smokeless tobacco: Former User     Alcohol use Not on file     Drug use: Not on file     Sexual activity: Not on file     Other Topics Concern     Not on file     Social History Narrative       Outpatient Encounter Prescriptions as of 4/10/2018   Medication Sig Dispense Refill     oxybutynin (DITROPAN-XL) 10 MG 24 hr tablet Take 10 mg by mouth daily       latanoprost (XALATAN) 0.005 % ophthalmic solution 1 drop At Bedtime       timolol (TIMOPTIC-XE) 0.25 % ophthalmic gel-form 1 drop every morning       augmented betamethasone dipropionate (DIPROLENE-AF) 0.05 % cream Apply sparingly to affected area twice daily as needed.  Do not apply to face. 100 g 3     No facility-administered encounter medications on file as of 4/10/2018.              Review Of Systems  Skin: As above  Eyes: negative  Ears/Nose/Throat: negative  Respiratory: No shortness of breath,  dyspnea on exertion, cough, or hemoptysis  Cardiovascular: negative  Gastrointestinal: negative  Genitourinary: negative  Musculoskeletal: negative  Neurologic: negative  Psychiatric: negative  Hematologic/Lymphatic/Immunologic: negative  Endocrine: negative      O:   NAD, WDWN, Alert & Oriented, Mood & Affect wnl, Vitals stable   Here today alone   /70  Pulse 70  SpO2 96%   General appearance normal   Vitals stable   Alert, oriented and in no acute distress     L shin granulating owund looks great      Eyes: Conjunctivae/lids:Normal     ENT: Lips, buccal mucosa, tongue: normal    MSK:Normal    Cardiovascular: peripheral edema none    Pulm: Breathing Normal    Neuro/Psych: Orientation:Normal; Mood/Affect:Normal      A/P:  1. L shin  Mix vaseline and lamisil  Healing well  Return to clinic 6 months      Again, thank you for allowing me to participate in the care of your patient.        Sincerely,        Christ Yang MD

## 2018-04-10 NOTE — PROGRESS NOTES
Jl Choi is a 62 year old year old male patient here today for f/u wound check on left shin.  Healing well, no issues. Patient reports the following previous treatments none.  Patient reports the following modifying factors none.  Associated symptoms: none.  Patient has no other skin complaints today.  Remainder of the HPI, Meds, PMH, Allergies, FH, and SH was reviewed in chart.      Past Medical History:   Diagnosis Date     Basal cell carcinoma      Malignant melanoma (H)     Back       History reviewed. No pertinent surgical history.     History reviewed. No pertinent family history.    Social History     Social History     Marital status: Single     Spouse name: N/A     Number of children: N/A     Years of education: N/A     Occupational History     Not on file.     Social History Main Topics     Smoking status: Former Smoker     Smokeless tobacco: Former User     Alcohol use Not on file     Drug use: Not on file     Sexual activity: Not on file     Other Topics Concern     Not on file     Social History Narrative       Outpatient Encounter Prescriptions as of 4/10/2018   Medication Sig Dispense Refill     oxybutynin (DITROPAN-XL) 10 MG 24 hr tablet Take 10 mg by mouth daily       latanoprost (XALATAN) 0.005 % ophthalmic solution 1 drop At Bedtime       timolol (TIMOPTIC-XE) 0.25 % ophthalmic gel-form 1 drop every morning       augmented betamethasone dipropionate (DIPROLENE-AF) 0.05 % cream Apply sparingly to affected area twice daily as needed.  Do not apply to face. 100 g 3     No facility-administered encounter medications on file as of 4/10/2018.              Review Of Systems  Skin: As above  Eyes: negative  Ears/Nose/Throat: negative  Respiratory: No shortness of breath, dyspnea on exertion, cough, or hemoptysis  Cardiovascular: negative  Gastrointestinal: negative  Genitourinary: negative  Musculoskeletal: negative  Neurologic: negative  Psychiatric: negative  Hematologic/Lymphatic/Immunologic:  negative  Endocrine: negative      O:   NAD, WDWN, Alert & Oriented, Mood & Affect wnl, Vitals stable   Here today alone   /70  Pulse 70  SpO2 96%   General appearance normal   Vitals stable   Alert, oriented and in no acute distress     L shin granulating owund looks great      Eyes: Conjunctivae/lids:Normal     ENT: Lips, buccal mucosa, tongue: normal    MSK:Normal    Cardiovascular: peripheral edema none    Pulm: Breathing Normal    Neuro/Psych: Orientation:Normal; Mood/Affect:Normal      A/P:  1. L shin  Mix vaseline and lamisil  Healing well  Return to clinic 6 months

## 2019-03-02 ENCOUNTER — TELEPHONE (OUTPATIENT)
Dept: OTHER | Facility: CLINIC | Age: 64
End: 2019-03-02